# Patient Record
Sex: MALE | Race: OTHER
[De-identification: names, ages, dates, MRNs, and addresses within clinical notes are randomized per-mention and may not be internally consistent; named-entity substitution may affect disease eponyms.]

---

## 2017-10-29 ENCOUNTER — HOSPITAL ENCOUNTER (EMERGENCY)
Dept: HOSPITAL 72 - EMR | Age: 27
Discharge: HOME | End: 2017-10-29
Payer: COMMERCIAL

## 2017-10-29 VITALS — HEIGHT: 68 IN | WEIGHT: 220 LBS | BODY MASS INDEX: 33.34 KG/M2

## 2017-10-29 VITALS — DIASTOLIC BLOOD PRESSURE: 92 MMHG | SYSTOLIC BLOOD PRESSURE: 155 MMHG

## 2017-10-29 DIAGNOSIS — J45.909: ICD-10-CM

## 2017-10-29 DIAGNOSIS — M25.571: Primary | ICD-10-CM

## 2017-10-29 PROCEDURE — 99283 EMERGENCY DEPT VISIT LOW MDM: CPT

## 2017-10-29 NOTE — EMERGENCY ROOM REPORT
History of Present Illness


General


Chief Complaint:  Lower Extremity Injury


Source:  Patient





Present Illness


HPI


Is a 27-year-old male with no past history.  He presents with right ankle/foot 

pain for one day.  No trauma.  Pain is to the dorsum of the ankle/foot area.  

Worse with walking.  Pain is 12 out of 10.  Similar symptom in the past.  

Denies any other complaint.  No fever or chills.  No nausea no vomiting.


Allergies:  


Coded Allergies:  


     No Known Allergies (Unverified , 1/21/16)





Patient History


Past Medical History:  see triage record, old chart reviewed


Past Surgical History:  none


Pertinent Family History:  none


Social History:  Denies: smoking


Immunizations:  other


Reviewed Nursing Documentation:  PMH: Agreed, PSxH: Agreed





Nursing Documentation-PMH


Past Medical History:  No History, Except For


Hx Asthma:  Yes





Review of Systems


Eye:  Denies: eye pain, blurred vision


ENT:  Denies: ear pain, nose congestion, throat swelling


Respiratory:  Denies: cough, shortness of breath


Cardiovascular:  Denies: chest pain, palpitations


Gastrointestinal:  Denies: abdominal pain, diarrhea, nausea, vomiting


Musculoskeletal:  Reports: joint pain, Denies: back pain


Skin:  Denies: rash


Neurological:  Denies: headache, numbness


Endocrine:  Denies: increased thirst, increased urine


Hematologic/Lymphatic:  Denies: easy bruising


All Other Systems:  negative except mentioned in HPI





Physical Exam





Vital Signs








  Date Time  Temp Pulse Resp B/P (MAP) Pulse Ox O2 Delivery O2 Flow Rate FiO2


 


10/29/17 01:00 99.7 109 18 155/92 97 Room Air  





vitals with high blood pressure


Sp02 EP Interpretation:  reviewed, normal


General Appearance:  well appearing, no apparent distress, alert


Head:  normocephalic, atraumatic


Eyes:  bilateral eye PERRL, bilateral eye EOMI


ENT:  hearing grossly normal, normal pharynx


Neck:  full range of motion, supple, no meningismus


Respiratory:  chest non-tender, lungs clear, normal breath sounds


Cardiovascular #1:  regular rate, rhythm, no murmur


Gastrointestinal:  normal bowel sounds, non tender, no mass, no organomegaly, 

no bruit, non-distended


Musculoskeletal:  back normal, normal range of motion, other - Right ankle: No 

edema.  No redness.  No warmth.  Tenderness over the anterior aspect of the 

ankle/proximal foot.  Sensation normal.  Pulse normal.


Psychiatric:  mood/affect normal


Skin:  warm/dry





Procedures


Splinting


Splinting :  


   Consent:  Verbal


   Location:  Right ankle


   Pre-Made Type:  ACE wrap


   Pre-Proc Neuro Vasc Exam:  normal


   Post-Proc Neuro Vasc Exam:  normal


   Patient Tolerated:  Well


   Complications:  None





Medical Decision Making


Diagnostic Impression:  


 Primary Impression:  


 Ankle joint pain


 Qualified Codes:  M25.571 - Pain in right ankle and joints of right foot


ER Course


This agent presents with pain over the ankle/foot area.  Could be inflammatory 

process.  I don't feel any redness or warmth to indicate gout, pseudogout or 

septic joint. no fractures   Or dislocation. We'll discharge home.


Other X-Ray Diagnostic Results


Other X-Ray Diagnostic Results :  


   X-Ray ordered:  X-rays right ankle


   # of Views/Limited Vs Complete:  3 View


   Indication:  Pain


   EP Interpretation:  Yes


   Interpretation:  no dislocation, no soft tissue swelling, no fractures


   Impression:  No acute disease


   Electronically Signed by:  Electronically signed by Cruzito Colvin MD





Last Vital Signs








  Date Time  Temp Pulse Resp B/P (MAP) Pulse Ox O2 Delivery O2 Flow Rate FiO2


 


10/29/17 01:00 99.7 109 18 155/92 97 Room Air  








Status:  improved


Disposition:  HOME, SELF-CARE


Condition:  Stable


Scripts


Ibuprofen* (MOTRIN*) 600 Mg Tablet


600 MG ORAL THREE TIMES A DAY, #30 TAB 0 Refills


   Prov: CRUZITO COLVIN M.D.         10/29/17


Referrals:  


NOT CHOSEN [facility]/MD,REFERRING (PCP)


Patient Instructions:  Ankle Sprain





Additional Instructions:  


Elevate ankle.  Ice pack to the area.  Followup with your DrRoger in 7 days.  

Return if worse.











CRUZITO COLVIN M.D. Oct 29, 2017 01:37

## 2017-10-29 NOTE — DIAGNOSTIC IMAGING REPORT
History: Pain



Technique: Frontal, lateral, and oblique views of the right foot are provided.



Comparison: No prior study is available for comparison.



Findings:

Overall bony mineralization is within normal limits. There is no evidence of 

acute

fracture or dislocation. No significant erosive or arthritic change is noted.



The soft tissues appear grossly normal. No significant joint effusion is noted.



Impression:

No evidence of acute fracture or dislocation.

## 2019-07-28 ENCOUNTER — HOSPITAL ENCOUNTER (EMERGENCY)
Dept: HOSPITAL 72 - EMR | Age: 29
Discharge: HOME | End: 2019-07-28
Payer: COMMERCIAL

## 2019-07-28 VITALS — HEIGHT: 67 IN | BODY MASS INDEX: 32.96 KG/M2 | WEIGHT: 210 LBS

## 2019-07-28 VITALS — DIASTOLIC BLOOD PRESSURE: 104 MMHG | SYSTOLIC BLOOD PRESSURE: 163 MMHG

## 2019-07-28 DIAGNOSIS — H60.91: Primary | ICD-10-CM

## 2019-07-28 PROCEDURE — 99282 EMERGENCY DEPT VISIT SF MDM: CPT

## 2019-07-28 NOTE — EMERGENCY ROOM REPORT
History of Present Illness


General


Chief Complaint:  Earache


Source:  Patient





Present Illness


HPI


29-year-old male, presents with right ear pain, started 2 days prior to arrival 

sharp, achy in nature, moderate severity, no aggravating relieving factors, 

patient thinks he has been using a Q-tip that may have aggravated it, no fever 

no chills, no chest pain, no shortness of breath


Allergies:  


Coded Allergies:  


     No Known Allergies (Unverified , 1/21/16)





Patient History


Social History:  Reports: smoking, alcohol use


Reviewed Nursing Documentation:  PMH: Agreed; PSxH: Agreed





Nursing Documentation-PMH


Past Medical History:  No History, Except For


Hx Asthma:  Yes





Review of Systems


All Other Systems:  negative except mentioned in HPI





Physical Exam





Vital Signs








  Date Time  Temp Pulse Resp B/P (MAP) Pulse Ox O2 Delivery O2 Flow Rate FiO2


 


7/28/19 20:22 99.0 77 18 163/104 (123) 97 Room Air  








Sp02 EP Interpretation:  reviewed, normal


General Appearance:  well appearing, no apparent distress, alert


Head:  normocephalic, atraumatic


Eyes:  bilateral eye PERRL, bilateral eye EOMI


ENT:  uvula midline, moist mucus membranes, other - Left ear normal, right ear 

canal swollen, difficult to appreciate TM,


Neck:  supple, thyroid normal, supple/symm/no masses


Respiratory:  lungs clear, no respiratory distress, no retraction, no accessory 

muscle use


Cardiovascular #1:  normal peripheral pulses, regular rate, rhythm, no edema, 

no gallop, no murmur


Gastrointestinal:  non tender, soft, no guarding, no rebound


Musculoskeletal:  normal inspection


Neurologic:  alert, oriented x3


Psychiatric:  mood/affect normal


Skin:  no rash, warm/dry





Medical Decision Making


Diagnostic Impression:  


 Primary Impression:  


 Earache, right


 Additional Impression:  


 Otitis externa of right ear


ER Course


Patient with most likely an otitis externa, difficult to appreciate eardrum, 

patient counseled not to use Q-tips, to follow-up with an ENT, return 

precautions discussed, antibiotic provide, disposition home with return 

precautions





Last Vital Signs








  Date Time  Temp Pulse Resp B/P (MAP) Pulse Ox O2 Delivery O2 Flow Rate FiO2


 


7/28/19 20:25 99.0 77 18 163/104 97 Room Air  








Disposition:  HOME, SELF-CARE


Condition:  Stable


Scripts


Amoxicillin/Potassium Clav 875-125* (AUGMENTIN 875-125 TABLET*) 1 Each Tablet


1 TAB ORAL TWICE A DAY, #20 TAB


   Prov: Keyon Griffith MD         7/28/19 


Ciprofloxacin Hcl/Dexameth (CIPRODEX OTIC SUSPENSION) 7.5 Ml Drops.susp


4 DROP RIGHT EAR TWICE A DAY for 10 Days, #1 EA


   Prov: Keyon Griffith MD         7/28/19


Referrals:  


[facility]


Patient Instructions:  Otitis Media, Adult, Easy-to-Read, Otitis Externa, Easy-

to-Read





Additional Instructions:  


The patient was provided with discharge instructions, notified to follow-up 

with a primary care doctor and or specialist in the next 24-48 hours, and to 

return to the ED if they have worsening of their symptoms. 





Please note that this report is being documented using DRAGON technology.


This can lead to erroneous entry secondary to incorrect interpretation by the 

dictating instrument. 





Please follow-up with an ENT doctor











Keyon Griffith MD Jul 28, 2019 20:37

## 2019-07-28 NOTE — NUR
ER DISCHARGE NOTE:

Patient is cleared to be discharged per ERMD, pt is aox4, on room air, with 
stable vital signs. pt was given dc and prescription instructions, pt was able 
to verbalize understanding, pt id band removed. pt is able to ambulate with 
steady gait. pt took all belongings. pt accompanied by mother.

## 2019-07-28 NOTE — NUR
ED Nurse Note:



pt ambulated to ed c/o right earache x 2 days. pt states, "I may have irritated 
it by over cleaining with a qtip. it feels like its swollen"